# Patient Record
(demographics unavailable — no encounter records)

---

## 2024-12-05 NOTE — ASSESSMENT
[FreeTextEntry1] : Assessment: Ingrown toenail with infection, left hallux, lateral aspect.  Plan: After discussing a treatment plan and getting approval from the patient, I performed a ring block on the affected toe(s) with 3 cc of 1% Lidocaine Plain.  The left foot was prepped in the usual and customary manner for foot surgery.  Once anesthesia was achieved I removed the offending nail border, I curretaged the remaining areas for any additional debris, which was further removed.  I then applied Amerigel with a sterile compression dressing. The patient was dispensed an Amerigel Wound Kit.  The patient was instructed is to cleanse the area(s) twice a day with the wound cleanse solution, followed by application the Amerigel wound gel and bulky sterile dressing.  This is to be performed twice a day and otherwise patient is to keep the area dry for the next 5-7 days. I advised the patient to notify the office right away if increased redness, swelling, pain, open wounds or discharge were observed.  She will complete her last dosage of the antibiotics.  PTR:  10 days.

## 2024-12-05 NOTE — PHYSICAL EXAM
[TextEntry] : Dorsalis pedis and posterior tibial pulses were palpable and equal bilat.  The capillary return was instant bilat.  The lateral aspect of the left hallux nail is swollen and erythematous.  It is painful to light palpation.  There is dry blood in the nail fold.

## 2024-12-05 NOTE — HISTORY OF PRESENT ILLNESS
[FreeTextEntry1] : Odilia Downs is an 11-year-old female patient who presents today with her mother for initial visit stating that she has had a problem with her left great toe for a number of weeks.  Her mother states that they went to see the pediatrician because the toe was red and swollen and they got some pus out, but it did not get better.  They have been soaking it, putting Bactroban on it, and she is completing oral antibiotics, but it still hurts and is still swollen.  They deny previous skin or nail problems.

## 2024-12-16 NOTE — ASSESSMENT
[FreeTextEntry1] : Assessment: Infection, left foot.  Plan:   I advised her to keep the area covered for one additional week.  I advised the patient to notify the office right away if increased redness, swelling, pain, open wounds or discharge were observed.  I discussed what to do about a possible recurrence.  She will return as needed.

## 2024-12-16 NOTE — HISTORY OF PRESENT ILLNESS
[FreeTextEntry1] : Odilia Downs returns with her mother stating that they are very happy with the left great toe.  It is feeling better.  She is not having pain anymore.  She is no longer on antibiotics.  Her mother was slightly concerned about the right great toe.  The right great toe, which Odilia states, is not hurting.

## 2025-05-23 NOTE — ASSESSMENT
[FreeTextEntry1] : Assessment: Ingrown toenail, left hallux lateral aspect.  Plan:   I performed straightback procedures utilizing a 12.7 cm sterile box lock, double spring fine cut back D tip fine tip stainless steel nail splitter removing the medial and lateral borders of both the left and right hallux nails as far back as tolerable and applied Amerigel wound gel with sterile compression dressings. The patient was instructed to start soaking the feet in lukewarm water and Epsom salts, 2 tablespoons per pint for 20 minutes twice per day.  The patient was advised to dry the feet with a clean towel and then apply a sterile dressing to the area for the next 5 days.  I advised the patient to notify the office right away if increased redness, swelling, pain, open wounds or discharge were observed.  PTR:  6-8 weeks or as needed.

## 2025-05-23 NOTE — PHYSICAL EXAM
[TextEntry] : The distal lateral aspect of the left hallux nail is erythematous and swollen.  Pain upon direct palpation of the area is present.

## 2025-05-23 NOTE — HISTORY OF PRESENT ILLNESS
[FreeTextEntry1] : Odilia Downs returns with her mother who states that her left great toe is bothering her again.  Odilia was hesitant to come back, but states that the toe is not as bad as last time but was starting to get swollen, so she told her mother and her mother brought her here.